# Patient Record
Sex: MALE | Race: WHITE | NOT HISPANIC OR LATINO | Employment: FULL TIME
[De-identification: names, ages, dates, MRNs, and addresses within clinical notes are randomized per-mention and may not be internally consistent; named-entity substitution may affect disease eponyms.]

---

## 2017-11-18 ENCOUNTER — HOSPITAL (OUTPATIENT)
Dept: OTHER | Age: 57
End: 2017-11-18

## 2021-03-28 ENCOUNTER — LAB SERVICES (OUTPATIENT)
Dept: LAB | Age: 61
End: 2021-03-28

## 2021-03-28 DIAGNOSIS — Z01.812 PRE-PROCEDURE LAB EXAM: Primary | ICD-10-CM

## 2021-03-28 LAB
SARS-COV-2 RNA RESP QL NAA+PROBE: NOT DETECTED
SERVICE CMNT-IMP: NORMAL
SERVICE CMNT-IMP: NORMAL

## 2021-03-28 PROCEDURE — U0003 INFECTIOUS AGENT DETECTION BY NUCLEIC ACID (DNA OR RNA); SEVERE ACUTE RESPIRATORY SYNDROME CORONAVIRUS 2 (SARS-COV-2) (CORONAVIRUS DISEASE [COVID-19]), AMPLIFIED PROBE TECHNIQUE, MAKING USE OF HIGH THROUGHPUT TECHNOLOGIES AS DESCRIBED BY CMS-2020-01-R: HCPCS | Performed by: PSYCHIATRY & NEUROLOGY

## 2021-03-28 PROCEDURE — U0005 INFEC AGEN DETEC AMPLI PROBE: HCPCS | Performed by: PSYCHIATRY & NEUROLOGY

## 2021-03-31 ENCOUNTER — HOSPITAL ENCOUNTER (OUTPATIENT)
Dept: ADMINISTRATIVE | Age: 61
Discharge: HOME | End: 2021-03-31
Attending: INTERNAL MEDICINE

## 2021-03-31 ENCOUNTER — ANESTHESIA EVENT (OUTPATIENT)
Dept: ADMINISTRATIVE | Age: 61
End: 2021-03-31

## 2021-03-31 ENCOUNTER — ANESTHESIA (OUTPATIENT)
Dept: ADMINISTRATIVE | Age: 61
End: 2021-03-31

## 2021-03-31 VITALS
BODY MASS INDEX: 28.77 KG/M2 | HEART RATE: 62 BPM | OXYGEN SATURATION: 100 % | TEMPERATURE: 98.1 F | SYSTOLIC BLOOD PRESSURE: 143 MMHG | WEIGHT: 201 LBS | RESPIRATION RATE: 16 BRPM | DIASTOLIC BLOOD PRESSURE: 92 MMHG | HEIGHT: 70 IN

## 2021-03-31 DIAGNOSIS — Z12.11 ENCOUNTER FOR SCREENING FOR MALIGNANT NEOPLASM OF COLON: ICD-10-CM

## 2021-03-31 PROCEDURE — 10002801 HB RX 250 W/O HCPCS

## 2021-03-31 PROCEDURE — 10002800 HB RX 250 W HCPCS

## 2021-03-31 PROCEDURE — 13000025 HB GI COMPLEX CASE EACH ADD MINUTE

## 2021-03-31 PROCEDURE — 10002807 HB RX 258

## 2021-03-31 PROCEDURE — 88305 TISSUE EXAM BY PATHOLOGIST: CPT | Performed by: INTERNAL MEDICINE

## 2021-03-31 PROCEDURE — 13000001 HB PHASE II RECOVERY EA 30 MINUTES

## 2021-03-31 PROCEDURE — 13000008 HB ANESTHESIA MAC OUTSIDE OR

## 2021-03-31 PROCEDURE — 13000024 HB GI COMPLEX CASE S/U + 1ST 15 MIN

## 2021-03-31 RX ORDER — PROPOFOL 10 MG/ML
INJECTION, EMULSION INTRAVENOUS PRN
Status: DISCONTINUED | OUTPATIENT
Start: 2021-03-31 | End: 2021-03-31

## 2021-03-31 RX ORDER — LIDOCAINE HYDROCHLORIDE 20 MG/ML
INJECTION, SOLUTION INFILTRATION; PERINEURAL PRN
Status: DISCONTINUED | OUTPATIENT
Start: 2021-03-31 | End: 2021-03-31

## 2021-03-31 RX ORDER — SODIUM CHLORIDE 9 MG/ML
INJECTION, SOLUTION INTRAVENOUS CONTINUOUS PRN
Status: DISCONTINUED | OUTPATIENT
Start: 2021-03-31 | End: 2021-03-31

## 2021-03-31 RX ADMIN — PROPOFOL 20 MG: 10 INJECTION, EMULSION INTRAVENOUS at 07:47

## 2021-03-31 RX ADMIN — LIDOCAINE HYDROCHLORIDE 3 ML: 20 INJECTION, SOLUTION INFILTRATION; PERINEURAL at 07:36

## 2021-03-31 RX ADMIN — PROPOFOL 30 MG: 10 INJECTION, EMULSION INTRAVENOUS at 07:50

## 2021-03-31 RX ADMIN — SODIUM CHLORIDE: 9 INJECTION, SOLUTION INTRAVENOUS at 07:33

## 2021-03-31 RX ADMIN — PROPOFOL 100 MCG/KG/MIN: 10 INJECTION, EMULSION INTRAVENOUS at 07:38

## 2021-03-31 RX ADMIN — PROPOFOL 50 MG: 10 INJECTION, EMULSION INTRAVENOUS at 07:36

## 2021-03-31 RX ADMIN — PROPOFOL 50 MG: 10 INJECTION, EMULSION INTRAVENOUS at 07:38

## 2021-03-31 SDOH — HEALTH STABILITY: MENTAL HEALTH: HOW OFTEN DO YOU HAVE A DRINK CONTAINING ALCOHOL?: NEVER

## 2021-03-31 ASSESSMENT — PAIN SCALES - GENERAL
PAINLEVEL_OUTOF10: 0

## 2021-04-01 LAB
ASR DISCLAIMER: NORMAL
CASE RPRT: NORMAL
CLINICAL INFO: NORMAL
PATH REPORT.FINAL DX SPEC: NORMAL
PATH REPORT.GROSS SPEC: NORMAL

## 2025-05-12 ENCOUNTER — TELEPHONE (OUTPATIENT)
Dept: OTOLARYNGOLOGY | Facility: CLINIC | Age: 65
End: 2025-05-12

## 2025-05-16 ENCOUNTER — OFFICE VISIT (OUTPATIENT)
Dept: OTOLARYNGOLOGY | Facility: CLINIC | Age: 65
End: 2025-05-16

## 2025-05-16 VITALS — WEIGHT: 216.81 LBS

## 2025-05-16 DIAGNOSIS — T16.1XXA FOREIGN BODY OF RIGHT EAR, INITIAL ENCOUNTER: Primary | ICD-10-CM

## 2025-05-16 RX ORDER — TAMSULOSIN HYDROCHLORIDE 0.4 MG/1
0.4 CAPSULE ORAL
COMMUNITY
Start: 2023-10-10

## 2025-05-16 RX ORDER — AMLODIPINE BESYLATE 10 MG/1
10 TABLET ORAL DAILY
COMMUNITY

## 2025-05-16 RX ORDER — ONDANSETRON 4 MG/1
4 TABLET, ORALLY DISINTEGRATING ORAL EVERY 8 HOURS PRN
COMMUNITY
Start: 2023-11-19

## 2025-05-16 RX ORDER — TETRACYCLINE HYDROCHLORIDE 500 MG/1
56 CAPSULE ORAL
COMMUNITY
Start: 2024-11-27

## 2025-05-16 RX ORDER — AMLODIPINE BESYLATE 10 MG/1
1 TABLET ORAL
COMMUNITY

## 2025-05-16 RX ORDER — PANTOPRAZOLE SODIUM 40 MG/1
28 TABLET, DELAYED RELEASE ORAL
COMMUNITY
Start: 2024-11-27

## 2025-05-16 RX ORDER — CHLORTHALIDONE 25 MG/1
25 TABLET ORAL DAILY
COMMUNITY

## 2025-05-16 RX ORDER — LIDOCAINE HYDROCHLORIDE 20 MG/ML
1 SOLUTION ORAL; TOPICAL 2 TIMES DAILY
COMMUNITY
Start: 2024-11-27

## 2025-05-16 RX ORDER — CHLORTHALIDONE 25 MG/1
1 TABLET ORAL
COMMUNITY

## 2025-05-16 RX ORDER — DICYCLOMINE HCL 20 MG
20 TABLET ORAL 3 TIMES DAILY PRN
COMMUNITY
Start: 2023-11-19

## 2025-05-16 RX ORDER — METRONIDAZOLE 500 MG/1
56 TABLET ORAL
COMMUNITY
Start: 2024-11-27

## 2025-05-16 NOTE — PROGRESS NOTES
Wilmer Flores is a 64 year old male.    Chief Complaint   Patient presents with    New Patient    Ear Problem     Patient here for mild ear pain and discomfort x 10 days.        HISTORY OF PRESENT ILLNESS  Presents with a 10-day history of discomfort in both ears with pain on occasion.  Does note that his hearing is down bilaterally.  Feels like he may have a foreign body in his ears.  Also complains of chronic nasal obstruction on the left for years.  Recalls an injury to his nose during his youth.      Social Hx on file[1]    Family History[2]    Past Medical History[3]    Past Surgical History[4]      REVIEW OF SYSTEMS    System Neg/Pos Details   Constitutional Negative Fatigue, fever and weight loss.   ENMT Negative Drooling.   Eyes Negative Blurred vision and vision changes.   Respiratory Negative Dyspnea and wheezing.   Cardio Negative Chest pain, irregular heartbeat/palpitations and syncope.   GI Negative Abdominal pain and diarrhea.   Endocrine Negative Cold intolerance and heat intolerance.   Neuro Negative Tremors.   Psych Negative Anxiety and depression.   Integumentary Negative Frequent skin infections, pigment change and rash.   Hema/Lymph Negative Easy bleeding and easy bruising.           PHYSICAL EXAM    Wt 216 lb 12.8 oz (98.3 kg)        Constitutional Normal Overall appearance - Normal.   Psychiatric Normal Orientation - Oriented to time, place, person & situation. Appropriate mood and affect.   Neck Exam Normal Inspection - Normal. Palpation - Normal. Parotid gland - Normal. Thyroid gland - Normal.   Eyes Normal Conjunctiva - Right: Normal, Left: Normal. Pupil - Right: Normal, Left: Normal. Fundus - Right: Normal, Left: Normal.   Neurological Normal Memory - Normal. Cranial nerves - Cranial nerves II through XII grossly intact.   Head/Face Normal Facial features - Normal. Eyebrows - Normal. Skull - Normal.        Nasopharynx Normal External nose - Normal. Lips/teeth/gums - Normal. Tonsils - Normal.  Oropharynx - Normal.   Ears Normal Inspection - Right: Normal, Left: Normal. Canal - Right: Normal, Left: Normal. TM - Right: Normal, Left: Normal.  Foreign body bilaterally   Skin Normal Inspection - Normal.        Lymph Detail Normal Submental. Submandibular. Anterior cervical. Posterior cervical. Supraclavicular.        Nose/Mouth/Throat Normal External nose - Normal. Lips/teeth/gums - Normal. Tonsils - Normal. Oropharynx - Normal.   Nose/Mouth/Throat Normal Nares - Right: Normal Left: Normal. Septum -deviated to the left turbinates - Right: Normal, Left: Normal.   Microscopy  Binocular microscopy was performed. The affected ear(s) was/were examined and all debris removed using suction. The findings are described in the physical exam.  Impacted foreign bodies removed bilaterally consisting of cotton from a Q-tip.  Normal exam otherwise.    Medications - Current[5]  ASSESSMENT AND PLAN    1. Foreign body of right ear, initial encounter  Foreign bodies removed bilaterally with resolution of his hearing loss.  Normal exam otherwise of the ears.  He does have a very deviated septum to the left at this time.  - REMV EXT CANAL FOREIGN BODY        This note was prepared using Dragon Medical voice recognition dictation software. As a result errors may occur. When identified these errors have been corrected. While every attempt is made to correct errors during dictation discrepancies may still exist    Isael Crane MD    5/16/2025    5:22 PM         [1]   Social History  Socioeconomic History    Marital status:    Tobacco Use    Smoking status: Never    Smokeless tobacco: Never   Vaping Use    Vaping status: Never Used   Substance and Sexual Activity    Alcohol use: Never    Drug use: Never   [2] History reviewed. No pertinent family history.  [3] History reviewed. No pertinent past medical history.  [4] History reviewed. No pertinent surgical history.  [5]   Current Outpatient Medications:     amLODIPine 10 MG  Oral Tab, Take 1 tablet (10 mg total) by mouth Before Dinner., Disp: , Rfl:     amLODIPine 10 MG Oral Tab, Take 1 tablet (10 mg total) by mouth daily., Disp: , Rfl:     FEROSUL 325 (65 Fe) MG Oral Tab, Take 1 tablet (325 mg total) by mouth 2 (two) times daily., Disp: , Rfl:     chlorthalidone 25 MG Oral Tab, Take 1 tablet (25 mg total) by mouth Before Dinner. (Patient not taking: Reported on 5/16/2025), Disp: , Rfl:     chlorthalidone 25 MG Oral Tab, Take 1 tablet (25 mg total) by mouth daily. (Patient not taking: Reported on 5/16/2025), Disp: , Rfl:     dicyclomine 20 MG Oral Tab, Take 1 tablet (20 mg total) by mouth 3 (three) times daily as needed. (Patient not taking: Reported on 5/16/2025), Disp: , Rfl:     metroNIDAZOLE 500 MG Oral Tab, 56 tablets (28,000 mg total). (Patient not taking: Reported on 5/16/2025), Disp: , Rfl:     ondansetron 4 MG Oral Tablet Dispersible, Take 1 tablet (4 mg total) by mouth every 8 (eight) hours as needed for Nausea. (Patient not taking: Reported on 5/16/2025), Disp: , Rfl:     pantoprazole 40 MG Oral Tab EC, 28 tablets (1,120 mg total). (Patient not taking: Reported on 5/16/2025), Disp: , Rfl:     tamsulosin 0.4 MG Oral Cap, Take 1 capsule (0.4 mg total) by mouth After dinner. (Patient not taking: Reported on 5/16/2025), Disp: , Rfl:     Tetracycline HCl 500 MG Oral Cap, 56 capsules (28,000 mg total). (Patient not taking: Reported on 5/16/2025), Disp: , Rfl:

## 2025-05-19 ENCOUNTER — TELEPHONE (OUTPATIENT)
Dept: OTOLARYNGOLOGY | Facility: CLINIC | Age: 65
End: 2025-05-19

## 2025-05-19 NOTE — TELEPHONE ENCOUNTER
Per patient stating he is experiencing pain on the inside of his ear and is asking if Dr. Crane would be able to prescribe anything for him, or what he should do next. Please call back.

## 2025-05-19 NOTE — TELEPHONE ENCOUNTER
RN LMTCB; please route callback to RN. RN noted that call room closes at 5pm, this would need to be addressed tomorrow.

## 2025-05-30 ENCOUNTER — TELEPHONE (OUTPATIENT)
Dept: OTOLARYNGOLOGY | Facility: CLINIC | Age: 65
End: 2025-05-30

## 2025-05-30 NOTE — TELEPHONE ENCOUNTER
Pt called the office with c/o Throat pain.     Follow up question for MD:  - Pt now scheduled to see Dr. Crane on 06/03/2025 for throat pain     Current symptoms:   - Throat pain- with and without swallowing (pt cannot describe pain)   - Little nasal congestion     Denies flu-like symptoms, nasal discharge or any other concerning symptoms.     Pertinent Medications/Last Administration:   - Ibuprofen      Patient Story: (Detail why pt is calling)  Throat pain that started 4-5 days ago. Left ear pain (pt cannot describe how it feels). Pt states throat pain is his main issue and is having pain with and w/o swallowing. Pt requesting antibiotics. RN informed pt that he is not presenting any symptoms that are related to an ongoing infection, Dr. Crane would need to assess more in office to decide on next steps. Pt now scheduled to see Dr. Crane on 06/03, pt agreeable with no further questions or concerns.

## 2025-05-30 NOTE — TELEPHONE ENCOUNTER
Per patient states he is still experiencing pain in his ear as well as a pain in his throat everytime he swallows. Please call back.

## 2025-06-03 ENCOUNTER — OFFICE VISIT (OUTPATIENT)
Dept: OTOLARYNGOLOGY | Facility: CLINIC | Age: 65
End: 2025-06-03

## 2025-06-03 VITALS — WEIGHT: 210 LBS

## 2025-06-03 DIAGNOSIS — R07.0 THROAT PAIN: ICD-10-CM

## 2025-06-03 DIAGNOSIS — H92.02 LEFT EAR PAIN: Primary | ICD-10-CM

## 2025-06-03 PROCEDURE — 31575 DIAGNOSTIC LARYNGOSCOPY: CPT | Performed by: OTOLARYNGOLOGY

## 2025-06-03 PROCEDURE — 99213 OFFICE O/P EST LOW 20 MIN: CPT | Performed by: OTOLARYNGOLOGY

## 2025-06-03 RX ORDER — HYDROCODONE BITARTRATE AND ACETAMINOPHEN 5; 325 MG/1; MG/1
TABLET ORAL
COMMUNITY
Start: 2025-05-30

## 2025-06-03 RX ORDER — CYCLOBENZAPRINE HCL 5 MG
5 TABLET ORAL 3 TIMES DAILY PRN
Qty: 90 TABLET | Refills: 1 | Status: SHIPPED | OUTPATIENT
Start: 2025-06-03

## 2025-06-03 RX ORDER — CELECOXIB 200 MG/1
200 CAPSULE ORAL 2 TIMES DAILY
Qty: 60 CAPSULE | Refills: 0 | Status: SHIPPED | OUTPATIENT
Start: 2025-06-03 | End: 2025-07-03

## 2025-06-03 NOTE — PROGRESS NOTES
Wilmer Flores is a 64 year old male.    Chief Complaint   Patient presents with    Throat Problem     Patient is here for throat pain and ear pain left side x 1 week        HISTORY OF PRESENT ILLNESS  Presents with a 10-day history of discomfort in both ears with pain on occasion.  Does note that his hearing is down bilaterally.  Feels like he may have a foreign body in his ears.  Also complains of chronic nasal obstruction on the left for years.  Recalls an injury to his nose during his youth.     6/3/25 ear pain for 1 week.  Also involves his neck as well have a hard time swallowing.  Negative CT scan performed at outside facility.  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  He does have tenderness to the musculature posterior to the angle of the mandible on the  left.  Here for further evaluation and management.      Social Hx on file[1]    Family History[2]    Past Medical History[3]    Past Surgical History[4]      REVIEW OF SYSTEMS    System Neg/Pos Details   Constitutional Negative Fatigue, fever and weight loss.   ENMT Negative Drooling.   Eyes Negative Blurred vision and vision changes.   Respiratory Negative Dyspnea and wheezing.   Cardio Negative Chest pain, irregular heartbeat/palpitations and syncope.   GI Negative Abdominal pain and diarrhea.   Endocrine Negative Cold intolerance and heat intolerance.   Neuro Negative Tremors.   Psych Negative Anxiety and depression.   Integumentary Negative Frequent skin infections, pigment change and rash.   Hema/Lymph Negative Easy bleeding and easy bruising.           PHYSICAL EXAM    Wt 210 lb (95.3 kg)        Constitutional Normal Overall appearance - Normal.   Psychiatric Normal Orientation - Oriented to time, place, person & situation. Appropriate mood and affect.   Neck Exam Normal Inspection - Normal. Palpation - Normal. Parotid gland - Normal. Thyroid gland - Normal.   Eyes Normal Conjunctiva - Right: Normal, Left: Normal. Pupil - Right:  Normal, Left: Normal. Fundus - Right: Normal, Left: Normal.   Neurological Normal Memory - Normal. Cranial nerves - Cranial nerves II through XII grossly intact.   Head/Face Normal Facial features - Normal. Eyebrows - Normal. Skull - Normal.        Nasopharynx Normal External nose - Normal. Lips/teeth/gums - Normal. Tonsils - Normal. Oropharynx - Normal.   Ears Normal Inspection - Right: Normal, Left: Normal. Canal - Right: Normal, Left: Normal. TM - Right: Normal, Left: Normal.   Skin Normal Inspection - Normal.        Lymph Detail Normal Submental. Submandibular. Anterior cervical. Posterior cervical. Supraclavicular.        Nose/Mouth/Throat Normal External nose - Normal. Lips/teeth/gums - Normal. Tonsils - Normal. Oropharynx - Normal.   Nose/Mouth/Throat Normal Nares - Right: Normal Left: Normal. Septum -Normal  Turbinates - Right: Normal, Left: Normal.   Procedures:  Endoscopy/Laryngoscopy  Pre-Procedure Care: Verbal consent was obtained. Procedure/risks were explained. Questions were answered. Correct patient identified. Correct side and site confirmed.      A topical spray of ).25% Neosynephrine was sprayed into the nose.    Laryngoscopy:  Flexible Fiberoptic Laryngoscopy: A diagnostic flexible fiberoptic laryngoscopy was performed. The flexible fiberoptic laryngoscope was placed into the nose or mouthand advanced  into the interior of the larynx. A thorough examination of the interior of the larynx was performed.   Findings were as follows.       Hypopharynx/Larynx:  Epiglottis is normal.  Arytenoids:  Bilateral: Arytenoids are normal.  Vocal folds-false  Bilateral: Vocal folds (false) are normal.  Vocal folds-true  Bilateral: Vocal folds (true) are normal.  Pyriform sinus:  Bilateral: Pyriform sinuses are normal.  Base of tongue is normal in appearance.  There is no airway obstruction.   General comments: Normal exam no abscess no lesions or other abnormalities noted            Medications -  Current[5]  ASSESSMENT AND PLAN    1. Left ear pain    2. Throat pain  Musculoskeletal?  No evidence of infection no fevers negative CT scan at outside facility.  Start warm heat soft diet chewing both sides of mouth as well as Celebrex twice daily and cyclobenzaprine 3 times daily as needed.  Return to see me in few weeks if no improvement.  - LARYNGOSCOPY,FLEX FIBER,DIAGNOSTIC        This note was prepared using Dragon Medical voice recognition dictation software. As a result errors may occur. When identified these errors have been corrected. While every attempt is made to correct errors during dictation discrepancies may still exist    Isael Crane MD    6/3/2025    3:47 PM         [1]   Social History  Socioeconomic History    Marital status:    Tobacco Use    Smoking status: Never    Smokeless tobacco: Never   Vaping Use    Vaping status: Never Used   Substance and Sexual Activity    Alcohol use: Never    Drug use: Never   [2] History reviewed. No pertinent family history.  [3] History reviewed. No pertinent past medical history.  [4] History reviewed. No pertinent surgical history.  [5]   Current Outpatient Medications:     HYDROcodone-acetaminophen 5-325 MG Oral Tab, TAKE 1 TABLET BY MOUTH EVERY 6 HOURS FOR UP TO 3 DAYS AS NEEDED FOR SEVERE PAIN, Disp: , Rfl:     celecoxib 200 MG Oral Cap, Take 1 capsule (200 mg total) by mouth 2 (two) times daily., Disp: 60 capsule, Rfl: 0    cyclobenzaprine 5 MG Oral Tab, Take 1 tablet (5 mg total) by mouth 3 (three) times daily as needed for Muscle spasms., Disp: 90 tablet, Rfl: 1    amLODIPine 10 MG Oral Tab, Take 1 tablet (10 mg total) by mouth Before Dinner., Disp: , Rfl:     amLODIPine 10 MG Oral Tab, Take 1 tablet (10 mg total) by mouth daily., Disp: , Rfl:     chlorthalidone 25 MG Oral Tab, Take 1 tablet (25 mg total) by mouth Before Dinner., Disp: , Rfl:     FEROSUL 325 (65 Fe) MG Oral Tab, Take 1 tablet (325 mg total) by mouth 2 (two) times daily., Disp:  , Rfl:     chlorthalidone 25 MG Oral Tab, Take 1 tablet (25 mg total) by mouth daily. (Patient not taking: Reported on 6/3/2025), Disp: , Rfl:     dicyclomine 20 MG Oral Tab, Take 1 tablet (20 mg total) by mouth 3 (three) times daily as needed. (Patient not taking: Reported on 6/3/2025), Disp: , Rfl:     metroNIDAZOLE 500 MG Oral Tab, 56 tablets (28,000 mg total). (Patient not taking: Reported on 6/3/2025), Disp: , Rfl:     ondansetron 4 MG Oral Tablet Dispersible, Take 1 tablet (4 mg total) by mouth every 8 (eight) hours as needed for Nausea. (Patient not taking: Reported on 6/3/2025), Disp: , Rfl:     pantoprazole 40 MG Oral Tab EC, 28 tablets (1,120 mg total). (Patient not taking: Reported on 6/3/2025), Disp: , Rfl:     tamsulosin 0.4 MG Oral Cap, Take 1 capsule (0.4 mg total) by mouth After dinner. (Patient not taking: Reported on 6/3/2025), Disp: , Rfl:     Tetracycline HCl 500 MG Oral Cap, 56 capsules (28,000 mg total). (Patient not taking: Reported on 6/3/2025), Disp: , Rfl: